# Patient Record
Sex: MALE | NOT HISPANIC OR LATINO | URBAN - METROPOLITAN AREA
[De-identification: names, ages, dates, MRNs, and addresses within clinical notes are randomized per-mention and may not be internally consistent; named-entity substitution may affect disease eponyms.]

---

## 2019-02-21 ENCOUNTER — IMPORTED ENCOUNTER (OUTPATIENT)
Dept: URBAN - METROPOLITAN AREA CLINIC 1 | Facility: CLINIC | Age: 74
End: 2019-02-21

## 2019-02-21 PROBLEM — H25.813: Noted: 2019-02-21

## 2019-02-21 PROBLEM — H04.123: Noted: 2019-02-21

## 2019-02-21 PROCEDURE — 92015 DETERMINE REFRACTIVE STATE: CPT

## 2019-02-21 PROCEDURE — 92004 COMPRE OPH EXAM NEW PT 1/>: CPT

## 2019-02-21 NOTE — PATIENT DISCUSSION
1.  Cataract OU:  Visually Significant discussed the risks benefits alternatives and limitations of cataract surgery. The patient stated a full understanding and a desire to proceed with the procedure. The patient will need to return for preop appointment with cataract measurements and to have any additional questions answered and start pre-operative eye drops as directed. OS then OD. Patient did not see PMG today Phaco PCLOtherwise follow-up2. Dry Eyes OU -- Recommended to patient to use Artificial Tears BID OU3. Return for an appointment for H and P. with Dr. Claudeen Cobble.

## 2019-03-18 ENCOUNTER — IMPORTED ENCOUNTER (OUTPATIENT)
Dept: URBAN - METROPOLITAN AREA CLINIC 1 | Facility: CLINIC | Age: 74
End: 2019-03-18

## 2019-03-18 PROBLEM — H25.812: Noted: 2019-03-18

## 2019-03-18 PROCEDURE — 92136 OPHTHALMIC BIOMETRY: CPT

## 2019-03-18 NOTE — PATIENT DISCUSSION
1.  Cataract OS:  Visually Significant discussed the risks benefits alternatives and limitations of cataract surgery. The patient stated a full understanding and a desire to proceed with the procedure. Pt understands they will need glasses post-op to achieve their best corrected vision. Phaco PCL OS (Standard lens standard procedure) **Myopic Goal 2. Dry Eyes OU -- Recommended to patient to use Artificial Tears BID OUReturn for an appointment for Return as scheduled with Dr. Flynn Samuel.

## 2019-03-27 ENCOUNTER — IMPORTED ENCOUNTER (OUTPATIENT)
Dept: URBAN - METROPOLITAN AREA CLINIC 1 | Facility: CLINIC | Age: 74
End: 2019-03-27

## 2019-03-28 ENCOUNTER — IMPORTED ENCOUNTER (OUTPATIENT)
Dept: URBAN - METROPOLITAN AREA CLINIC 1 | Facility: CLINIC | Age: 74
End: 2019-03-28

## 2019-03-28 PROBLEM — Z96.1: Noted: 2019-03-28

## 2019-03-28 PROCEDURE — 99024 POSTOP FOLLOW-UP VISIT: CPT

## 2019-03-28 NOTE — PATIENT DISCUSSION
POD#1 CE/IOL OS (Standard) doing well. *Myopic Goal*. Continue all 3 gtts as prescribed and until gone. Use Inveltys BID OS Ilevro Qdaily OS Ocuflox TID OS 1 gtt Combigan applied prior to leaving.  Post op Warnings Reiterated RTC as scheduled

## 2019-04-02 ENCOUNTER — IMPORTED ENCOUNTER (OUTPATIENT)
Dept: URBAN - METROPOLITAN AREA CLINIC 1 | Facility: CLINIC | Age: 74
End: 2019-04-02

## 2019-04-02 PROBLEM — H25.811: Noted: 2019-04-02

## 2019-04-02 PROCEDURE — 92136 OPHTHALMIC BIOMETRY: CPT

## 2019-04-02 NOTE — PATIENT DISCUSSION
1.  Cataract OD: Visually Significant secondary to glare discussed the risks benefits alternatives and limitations of cataract surgery. The patient stated a full understanding and a desire to proceed with the procedure. Pt understands they will need glasses post-op to achieve their best corrected vision. Phaco PCL OD 2. POW#2  CE/IOL OS (Standard) doing well. *Myopic Goal*.  Use Inveltys BID OS till out Use Ilevro Qdaily OS till out F/u as scheduled 2nd eye

## 2019-04-10 ENCOUNTER — IMPORTED ENCOUNTER (OUTPATIENT)
Dept: URBAN - METROPOLITAN AREA CLINIC 1 | Facility: CLINIC | Age: 74
End: 2019-04-10

## 2019-04-10 PROBLEM — Z96.1: Noted: 2019-04-10

## 2019-04-10 PROCEDURE — 66984 XCAPSL CTRC RMVL W/O ECP: CPT

## 2019-04-10 PROCEDURE — 99024 POSTOP FOLLOW-UP VISIT: CPT

## 2019-04-10 NOTE — PATIENT DISCUSSION
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 Instilled 1 drop of Besivance & Combigan in office now OD. Continue all 3 gtts as prescribed and until gone. Use Inveltys BID OD Ilevro Qdaily OD Ocuflox TID OD Post op Warnings Reiterated RTC as scheduled

## 2019-04-11 ENCOUNTER — IMPORTED ENCOUNTER (OUTPATIENT)
Dept: URBAN - METROPOLITAN AREA CLINIC 1 | Facility: CLINIC | Age: 74
End: 2019-04-11

## 2019-04-11 PROBLEM — Z96.1: Noted: 2019-04-11

## 2019-04-11 PROCEDURE — 99024 POSTOP FOLLOW-UP VISIT: CPT

## 2019-04-11 NOTE — PATIENT DISCUSSION
1. 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 Previously pre Phaco Sx no view of Retina but now evidence of old bleed inferior Retina and during Cataract Sx there was active bleeding in Vitreous Cavity visualized otherwise Phaco was uncomplicated. Refer to Dr. Corrie Delarosa for second opinion within the next week. Continue all 3 gtts as prescribed and until gone. Use Inveltys BID OD Ilevro Qdaily OD Ocuflox TID OD Post op Warnings Reiterated 2. POW#3 Phaco/ PCL OS (Standard) -- Doing well *Myopic Goal*. Continue all 3 gtts as prescribed and until gone. Use Inveltys BID OS till out Use Ilevro Qdaily OS till out Post op Warnings Reiterated RTC as scheduled Refer to Retinal Specialist Dr. Corrie Delarosa within the next week.

## 2019-05-02 ENCOUNTER — IMPORTED ENCOUNTER (OUTPATIENT)
Dept: URBAN - METROPOLITAN AREA CLINIC 1 | Facility: CLINIC | Age: 74
End: 2019-05-02

## 2019-05-02 PROBLEM — Z96.1: Noted: 2019-05-02

## 2019-05-02 PROCEDURE — 99024 POSTOP FOLLOW-UP VISIT: CPT

## 2019-05-02 NOTE — PATIENT DISCUSSION
POM#1 CE/IOL OU (Standard OU) doing well. **Myopic Goal  Use Inveltys BID OD till out Use Ilevro Qdaily OD till out MRX for glasses given Followed by Dr. Jarrett Console for old Fibrosis Heme inferior retina f/u as scheduled Return for an appointment in January 30 with Dr. Nettie Deleon.

## 2020-04-02 ENCOUNTER — IMPORTED ENCOUNTER (OUTPATIENT)
Dept: URBAN - METROPOLITAN AREA CLINIC 1 | Facility: CLINIC | Age: 75
End: 2020-04-02

## 2020-04-02 PROBLEM — H04.123: Noted: 2020-04-02

## 2020-04-02 PROBLEM — Z96.1: Noted: 2020-04-02

## 2020-04-02 PROBLEM — H16.143: Noted: 2020-04-02

## 2020-04-02 PROBLEM — H43.811: Noted: 2020-04-02

## 2020-04-02 PROCEDURE — 92015 DETERMINE REFRACTIVE STATE: CPT

## 2020-04-02 PROCEDURE — 92014 COMPRE OPH EXAM EST PT 1/>: CPT

## 2020-04-02 NOTE — PATIENT DISCUSSION
1.  ROMINA w/ PEK OU -- The use/continuation of artificial tears were recommended. 2.  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 No retinal tears holes or detachments seen. RD precautions given. 3.  Pseudophakia OU -- Standard OU. Return for an appointment in 1 year for a 30 with Dr. Leesa Carrera.

## 2022-04-02 ASSESSMENT — VISUAL ACUITY
OD_SC: J5
OS_CC: J1
OD_PH: SC 20/40
OS_CC: J1
OS_CC: 20/200
OD_SC: 20/20
OS_GLARE: 20/80
OS_CC: J1
OS_SC: 20/40
OD_SC: 20/70
OD_CC: 20/70
OD_CC: 20/70
OS_CC: 20/200
OS_SC: 20/20
OD_CC: J10
OS_CC: 20/100
OS_SC: J1
OS_SC: J1
OD_CC: J1
OS_SC: 20/40
OS_CC: 20/100
OD_CC: J10
OS_SC: J1
OD_SC: J1
OS_GLARE: 20/80
OS_PH: SC 20/30
OS_SC: J3
OS_CC: 20/100
OD_SC: 20/70
OD_CC: 20/100-1

## 2022-04-02 ASSESSMENT — TONOMETRY
OD_IOP_MMHG: 19
OS_IOP_MMHG: 19
OD_IOP_MMHG: 13
OS_IOP_MMHG: 13
OD_IOP_MMHG: 12
OS_IOP_MMHG: 10
OS_IOP_MMHG: 23
OS_IOP_MMHG: 12
OD_IOP_MMHG: 26
OS_IOP_MMHG: 12
OD_IOP_MMHG: 12
OS_IOP_MMHG: 19
OD_IOP_MMHG: 18

## 2023-10-13 ENCOUNTER — COMPREHENSIVE EXAM (OUTPATIENT)
Dept: URBAN - METROPOLITAN AREA CLINIC 1 | Facility: CLINIC | Age: 78
End: 2023-10-13

## 2023-10-13 DIAGNOSIS — H26.493: ICD-10-CM

## 2023-10-13 DIAGNOSIS — H43.811: ICD-10-CM

## 2023-10-13 DIAGNOSIS — H04.123: ICD-10-CM

## 2023-10-13 DIAGNOSIS — H16.143: ICD-10-CM

## 2023-10-13 PROCEDURE — 99214 OFFICE O/P EST MOD 30 MIN: CPT

## 2023-10-13 PROCEDURE — 92015 DETERMINE REFRACTIVE STATE: CPT

## 2023-10-13 ASSESSMENT — VISUAL ACUITY
OD_CC: 20/30-2
OS_CC: J1
OD_CC: J1
OS_CC: 20/20-1

## 2023-10-13 ASSESSMENT — TONOMETRY
OD_IOP_MMHG: 12
OS_IOP_MMHG: 11

## 2023-12-19 ENCOUNTER — EMERGENCY VISIT (OUTPATIENT)
Dept: URBAN - METROPOLITAN AREA CLINIC 1 | Facility: CLINIC | Age: 78
End: 2023-12-19

## 2023-12-19 DIAGNOSIS — H33.301: ICD-10-CM

## 2023-12-19 DIAGNOSIS — H26.493: ICD-10-CM

## 2023-12-19 PROCEDURE — 92015 DETERMINE REFRACTIVE STATE: CPT

## 2023-12-19 PROCEDURE — 99213 OFFICE O/P EST LOW 20 MIN: CPT

## 2023-12-19 ASSESSMENT — TONOMETRY
OD_IOP_MMHG: 12
OS_IOP_MMHG: 12

## 2023-12-19 ASSESSMENT — VISUAL ACUITY
OS_CC: 20/20
OS_BAT: 20/40
OD_CC: 20/100
OD_BAT: 20/200

## 2024-01-12 ENCOUNTER — FOLLOW UP (OUTPATIENT)
Dept: URBAN - METROPOLITAN AREA CLINIC 1 | Facility: CLINIC | Age: 79
End: 2024-01-12

## 2024-01-12 DIAGNOSIS — H26.493: ICD-10-CM

## 2024-01-12 DIAGNOSIS — H43.11: ICD-10-CM

## 2024-01-12 PROCEDURE — 99213 OFFICE O/P EST LOW 20 MIN: CPT

## 2024-01-12 ASSESSMENT — VISUAL ACUITY
OS_SC: 20/25
OD_SC: 20/400